# Patient Record
Sex: FEMALE | Race: OTHER | NOT HISPANIC OR LATINO
[De-identification: names, ages, dates, MRNs, and addresses within clinical notes are randomized per-mention and may not be internally consistent; named-entity substitution may affect disease eponyms.]

---

## 2021-08-01 ENCOUNTER — TRANSCRIPTION ENCOUNTER (OUTPATIENT)
Age: 66
End: 2021-08-01

## 2021-10-19 ENCOUNTER — EMERGENCY (EMERGENCY)
Facility: HOSPITAL | Age: 66
LOS: 1 days | Discharge: HOME | End: 2021-10-19
Attending: EMERGENCY MEDICINE | Admitting: STUDENT IN AN ORGANIZED HEALTH CARE EDUCATION/TRAINING PROGRAM
Payer: MEDICAID

## 2021-10-19 VITALS — SYSTOLIC BLOOD PRESSURE: 102 MMHG | HEART RATE: 76 BPM | DIASTOLIC BLOOD PRESSURE: 64 MMHG

## 2021-10-19 VITALS
OXYGEN SATURATION: 98 % | DIASTOLIC BLOOD PRESSURE: 82 MMHG | TEMPERATURE: 98 F | HEIGHT: 60 IN | HEART RATE: 92 BPM | SYSTOLIC BLOOD PRESSURE: 157 MMHG | RESPIRATION RATE: 16 BRPM | WEIGHT: 115.08 LBS

## 2021-10-19 DIAGNOSIS — G43.909 MIGRAINE, UNSPECIFIED, NOT INTRACTABLE, WITHOUT STATUS MIGRAINOSUS: ICD-10-CM

## 2021-10-19 LAB
ALBUMIN SERPL ELPH-MCNC: 4.1 G/DL — SIGNIFICANT CHANGE UP (ref 3.4–5)
ALP SERPL-CCNC: 117 U/L — SIGNIFICANT CHANGE UP (ref 40–120)
ALT FLD-CCNC: 27 U/L — SIGNIFICANT CHANGE UP (ref 12–42)
ANION GAP SERPL CALC-SCNC: 10 MMOL/L — SIGNIFICANT CHANGE UP (ref 9–16)
AST SERPL-CCNC: 23 U/L — SIGNIFICANT CHANGE UP (ref 15–37)
BASOPHILS # BLD AUTO: 0.04 K/UL — SIGNIFICANT CHANGE UP (ref 0–0.2)
BASOPHILS NFR BLD AUTO: 0.5 % — SIGNIFICANT CHANGE UP (ref 0–2)
BILIRUB SERPL-MCNC: 0.4 MG/DL — SIGNIFICANT CHANGE UP (ref 0.2–1.2)
BUN SERPL-MCNC: 12 MG/DL — SIGNIFICANT CHANGE UP (ref 7–23)
CALCIUM SERPL-MCNC: 9.1 MG/DL — SIGNIFICANT CHANGE UP (ref 8.5–10.5)
CHLORIDE SERPL-SCNC: 106 MMOL/L — SIGNIFICANT CHANGE UP (ref 96–108)
CO2 SERPL-SCNC: 27 MMOL/L — SIGNIFICANT CHANGE UP (ref 22–31)
CREAT SERPL-MCNC: 0.74 MG/DL — SIGNIFICANT CHANGE UP (ref 0.5–1.3)
EOSINOPHIL # BLD AUTO: 0.01 K/UL — SIGNIFICANT CHANGE UP (ref 0–0.5)
EOSINOPHIL NFR BLD AUTO: 0.1 % — SIGNIFICANT CHANGE UP (ref 0–6)
GLUCOSE SERPL-MCNC: 102 MG/DL — HIGH (ref 70–99)
HCT VFR BLD CALC: 38.7 % — SIGNIFICANT CHANGE UP (ref 34.5–45)
HGB BLD-MCNC: 12.8 G/DL — SIGNIFICANT CHANGE UP (ref 11.5–15.5)
IMM GRANULOCYTES NFR BLD AUTO: 0.2 % — SIGNIFICANT CHANGE UP (ref 0–1.5)
LYMPHOCYTES # BLD AUTO: 1.59 K/UL — SIGNIFICANT CHANGE UP (ref 1–3.3)
LYMPHOCYTES # BLD AUTO: 19.6 % — SIGNIFICANT CHANGE UP (ref 13–44)
MCHC RBC-ENTMCNC: 29 PG — SIGNIFICANT CHANGE UP (ref 27–34)
MCHC RBC-ENTMCNC: 33.1 GM/DL — SIGNIFICANT CHANGE UP (ref 32–36)
MCV RBC AUTO: 87.6 FL — SIGNIFICANT CHANGE UP (ref 80–100)
MONOCYTES # BLD AUTO: 0.43 K/UL — SIGNIFICANT CHANGE UP (ref 0–0.9)
MONOCYTES NFR BLD AUTO: 5.3 % — SIGNIFICANT CHANGE UP (ref 2–14)
NEUTROPHILS # BLD AUTO: 6.02 K/UL — SIGNIFICANT CHANGE UP (ref 1.8–7.4)
NEUTROPHILS NFR BLD AUTO: 74.3 % — SIGNIFICANT CHANGE UP (ref 43–77)
NRBC # BLD: 0 /100 WBCS — SIGNIFICANT CHANGE UP (ref 0–0)
PLATELET # BLD AUTO: 250 K/UL — SIGNIFICANT CHANGE UP (ref 150–400)
POTASSIUM SERPL-MCNC: 4 MMOL/L — SIGNIFICANT CHANGE UP (ref 3.5–5.3)
POTASSIUM SERPL-SCNC: 4 MMOL/L — SIGNIFICANT CHANGE UP (ref 3.5–5.3)
PROT SERPL-MCNC: 8.1 G/DL — SIGNIFICANT CHANGE UP (ref 6.4–8.2)
RBC # BLD: 4.42 M/UL — SIGNIFICANT CHANGE UP (ref 3.8–5.2)
RBC # FLD: 13.6 % — SIGNIFICANT CHANGE UP (ref 10.3–14.5)
SODIUM SERPL-SCNC: 143 MMOL/L — SIGNIFICANT CHANGE UP (ref 132–145)
WBC # BLD: 8.11 K/UL — SIGNIFICANT CHANGE UP (ref 3.8–10.5)
WBC # FLD AUTO: 8.11 K/UL — SIGNIFICANT CHANGE UP (ref 3.8–10.5)

## 2021-10-19 PROCEDURE — 99284 EMERGENCY DEPT VISIT MOD MDM: CPT

## 2021-10-19 PROCEDURE — 36415 COLL VENOUS BLD VENIPUNCTURE: CPT

## 2021-10-19 PROCEDURE — 96375 TX/PRO/DX INJ NEW DRUG ADDON: CPT

## 2021-10-19 PROCEDURE — 96374 THER/PROPH/DIAG INJ IV PUSH: CPT

## 2021-10-19 PROCEDURE — 85025 COMPLETE CBC W/AUTO DIFF WBC: CPT

## 2021-10-19 PROCEDURE — 99284 EMERGENCY DEPT VISIT MOD MDM: CPT | Mod: 25

## 2021-10-19 PROCEDURE — 80053 COMPREHEN METABOLIC PANEL: CPT

## 2021-10-19 RX ORDER — SODIUM CHLORIDE 9 MG/ML
1000 INJECTION INTRAMUSCULAR; INTRAVENOUS; SUBCUTANEOUS ONCE
Refills: 0 | Status: COMPLETED | OUTPATIENT
Start: 2021-10-19 | End: 2021-10-19

## 2021-10-19 RX ORDER — KETOROLAC TROMETHAMINE 30 MG/ML
15 SYRINGE (ML) INJECTION ONCE
Refills: 0 | Status: DISCONTINUED | OUTPATIENT
Start: 2021-10-19 | End: 2021-10-19

## 2021-10-19 RX ORDER — METOCLOPRAMIDE HCL 10 MG
10 TABLET ORAL ONCE
Refills: 0 | Status: COMPLETED | OUTPATIENT
Start: 2021-10-19 | End: 2021-10-19

## 2021-10-19 RX ADMIN — Medication 15 MILLIGRAM(S): at 13:36

## 2021-10-19 RX ADMIN — Medication 10 MILLIGRAM(S): at 13:36

## 2021-10-19 RX ADMIN — SODIUM CHLORIDE 1000 MILLILITER(S): 9 INJECTION INTRAMUSCULAR; INTRAVENOUS; SUBCUTANEOUS at 13:36

## 2021-10-19 NOTE — ED PROVIDER NOTE - OBJECTIVE STATEMENT
66 y/o F with PMHx of migraine ARIAS presents to the ED for 5 days of a pounding L sided HA with gradual onset. Pt also reports associated light sensitivity and nausea that is typical of her usual migraines. No relief with Excedrin use at home. Pt has been worked up for migraines as outpatient in the past with imaging studies [including MRI] showing negative results. Pt denies neck pain/stiffness, vomiting, fevers, chills, changes in vision/speech, or unilateral numbness/weakness.

## 2021-10-19 NOTE — ED PROVIDER NOTE - PROGRESS NOTE DETAILS
Pt reports complete resolution of headache after meds and wants to go home.  Labs unremarkable.    Will give referral to neurologist as outpatient as pt has not seen neuro in years.  Return precautions discussed with pt and daughter at bedside.

## 2021-10-19 NOTE — ED PROVIDER NOTE - PATIENT PORTAL LINK FT
You can access the FollowMyHealth Patient Portal offered by [facility] by registering at the following website: [email or web address]. By joining [employer]s FollowMyHealth portal, you will also be able to view your health information using other applications (apps) compatible with our system.

## 2021-10-19 NOTE — ED ADULT TRIAGE NOTE - CHIEF COMPLAINT QUOTE
Patient history of migraine, presents to the ED complaining of headache and nausea x 5 days. Been  taking Excedrin with minimal relief.

## 2021-10-19 NOTE — ED PROVIDER NOTE - CARE PROVIDER_API CALL
Kamila Corral)  Neurology; Vascular Neurology  [address], [address] [place] [address]  Phone: [phone]  Fax: [phone]  Follow Up Time:     Miya Greenberg)  Neurology  [address] [place] [address]  Phone: [phone]  Fax: [phone]  Follow Up Time:

## 2021-10-19 NOTE — ED PROVIDER NOTE - CLINICAL SUMMARY MEDICAL DECISION MAKING FREE TEXT BOX
66 y/o F with Hx of migraines presenting with 5 days of L sided HA that is typical of her usual migraines. On exam, Pt is afebrile and well appearing. No localizing neurological deficits. Pt has FROM of the neck. Suspect for migraine HA. No red flags to suggest subarachnoid bleed at this time. Will treat pain and reassess.

## 2021-10-19 NOTE — ED PROVIDER NOTE - CARE PROVIDERS DIRECT ADDRESSES
[email or web address]

## 2021-10-19 NOTE — ED PROVIDER NOTE - NSFOLLOWUPINSTRUCTIONS_ED_ALL_ED_FT
Migraña    LO QUE NECESITA SABER:    ¿Qué es elroy migraña?Elroy migraña es un dolor de keith intenso. El dolor puede ser tan severo que interfiere con sera actividades cotidianas. Elroy migraña puede durar desde pocas horas hasta varios días. La causa exacta de la migraña no es conocida. Los antecedentes familiares de migrañas aumentan winston riesgo. El riesgo también es mayor si es kerry o ymii medicamentos davida estrógenos o un vasodilatador.    ¿Cuáles son las señales de advertencia de que elroy migraña está por comenzar?Los signos de advertencia generalmente comienzan de 15 a 60 minutos antes del dolor de keith:  •Cambios visuales (auras), davida visión borrosa, ceguera temporal o manchas brillantes, líneas o alucinaciones      •Cansancio anormal o bostezos frecuentes      •Hormigueo en winston brazo o pierna      ¿Cuáles son los signos y síntomas de elroy migraña?La migraña comienza generalmente con un dolor monótono alrededor del umm o la sien. El dolor podría empeorar con el movimiento. Usted también podría tener lo siguiente:  •Dolor en [place] keith que podría aumentar hasta el punto que usted no es capaz de realizar sera actividades cotidianas      •Dolor en mark o ambos lados de winston keith      •Dolor punzante, pulsante o jaime en la keith      •Náuseas y vómitos      •Sensibilidad a la tevin, el ruido o los olores      ¿Qué puede desencadenar elroy migraña?  •El estrés, fatiga de los ojos, dormir demasiado o no dormir lo suficiente      •Cambios hormonales en las mujeres debido a las píldoras anticonceptivas, embarazo, menopausia o ebony la menstruación      •Omitir comidas, pasar mucho tiempo sin comer o no mehreen suficientes líquidos      •Ciertos alimentos o bebidas, davida el chocolate, queso arden, alcohol o bebidas que contienen cafeína      •Alimentos que contienen gluten, nitratos, glutamato monosódico o endulzantes artificiales      •Tevin solar, luces brillantes o luces parpadeantes, ruidos marycarmen, humo u olores marycarmen      •Calor, humedad o cambios en el clima      ¿Cómo se diagnostica elroy migraña?Winston médico le preguntará acerca de sera medardo de keith. Describa el dolor y otros síntomas, davida náuseas. Infórmele al médico si usted enrique que hubo algún desencadenante del dolor. El médico también querrá saber qué fue usted comió y tomó antes que le empezara el dolor. Infórmele al médico sobre cualquier afección médica que usted tenga o sea característica de winston mike. Incluya cualquier estresor reciente que ha tenido. Es posible que también necesite alguno de los siguientes tratamientos:  •Un examen neurológicose usa para revisar cómo reaccionan las pupilas a la tevin. Winston médico podría revisar winston memoria, la forma en que agarra las cosas con winston mano y el equilibrio.      •Las imágenes por tomografía computarizada o por resonancia magnéticapodrían mehreen imágenes de winston cerebro. Es posible que le administren un medio de contraste que sirve para que el cerebro se observe con mayor claridad en las imágenes. Dígale al médico si usted alguna vez ha tenido elroy reacción alérgica al líquido de contraste. No entre a la thang donde se realiza la resonancia magnética con algo de metal. El metal puede causar lesiones serias. Dígale al médico si usted tiene algo de metal dentro de winston cuerpo o por encima.      ¿Cómo se trata elroy migraña?Las migrañas no tienen lobito. La meta del tratamiento es reducir sera síntomas.  •Los medicamentospodrían administrarse para ayudarlo a controlar las migrañas. [place] el medicamento tan pronto davida sienta que le comienza elroy migraña, o según le hayan indicado. Winston médico podría recomendarle cualquiera de los siguientes:?Medicamentos para la migrañase utilizan para ayudar a evitar o detener elroy migraña.      ?Los RENATE,pueden disminuir la inflamación y el dolor o la fiebre. Johanne medicamento está disponible con o sin elroy receta médica. Los RENATE pueden causar sangrado estomacal o problemas renales en ciertas personas. Si usted yimi un medicamento anticoagulante, siempre pregúntele a winston médico si los RENATE son seguros para usted. Siempre emile la etiqueta de johanne medicamento y siga las instrucciones.      ?Acetaminofénalivia el dolor y baja la fiebre. Está disponible sin receta médica. Pregunte la cantidad y la frecuencia con que debe tomarlos. Siga las indicaciones. Emile las etiquetas de todos los demás medicamentos que esté usando para saber si también contienen acetaminofén, o pregunte a winston médico o farmacéutico. El acetaminofén puede causar daño en el hígado cuando no se yimi de forma correcta. No use más de 4 gramos (4000 miligramos) en total de acetaminofeno en un día.      ?Puede administrarsepodrían administrarse. Pregunte al médico cómo debe mehreen johanne medicamento de forma العلي. Algunos medicamentos recetados para el dolor contienen acetaminofén. No tome otros medicamentos que contengan acetaminofén sin consultarlo con winston médico. Demasiado acetaminofeno puede causar daño al hígado. Los medicamentos recetados para el dolor podrían causar estreñimiento. Pregunte a winston médico davida prevenir o tratar estreñimiento.      ?Los medicamentos contra las náuseaspueden darse para calmar winston estómago y ayudarle a prevenir los vómitos. Johanne medicamento también puede aliviar el dolor.      •La terapia cognitiva conductual (TCC)puede ayudarlo a aprender maneras de controlar y prevenir las migrañas. Un terapeuta puede enseñarle a relajarse y a reducir el estrés. Puede aprender maneras de crear elroy nutrición saludable, actividad y hábitos de sueño para prevenir las migrañas. El terapeuta también puede ayudarlo a controlar afecciones que pueden afectar las migrañas, davida la ansiedad o la depresión.      ¿Qué puedo hacer para manejar mis síntomas?  •Repose en elroy habitación oscura y tranquila.[place] ayudará a disminuir el dolor. El dormir también podría ayudarlo a aliviar winston dolor.      •Aplique hielo para reducir el dolor.Use elroy compresa de hielo o ponga hielo triturado en elroy bolsa de plástico. Cubra el paquete de hielo con elroy toalla y colóqueselo en la keith. Aplique hielo ebony 15 a 20 minutos cada hora.      •Aplique calor para disminuir el dolor y los espasmos musculares.Utilice elroy toalla pequeña empapada con [place], elroy almohada térmica o tome un baño de ezra con agua tibia. Aplique la compresa caliente sobre el área por 20 a 30 minutos cada 2 horas. Usted puede alternar el calor y el hielo.      •Mantenga un registro de las migrañas.Escriba cuándo comienzan y terminan sera migrañas. Incluya sera síntomas y qué estaba haciendo cuando comenzó elroy migraña. Registre lo que comió y lo que tomó las 24 horas antes de que comenzara winston migraña. Mantenga un registro de lo que hizo para tratar winston migraña y si funcionó. Traiga el registro de las migrañas con usted a las citas con winston médico.      ¿Qué puedo hacer para evitar otra migraña?  •Evite el dolor de keith por uso excesivo de medicamentos.[place] los analgésicos solamente según le hayan indicado. Un medicamento puede estar limitado a elroy cierta cantidad cada mes. El médico puede ayudarlo a crear un plan para que reciba elroy cantidad العلي cada mes.      •No fume.La nicotina y otras sustancias químicas en los cigarrillos y puros pueden desencadenar elroy migraña o agravarla. Pida información a winston médico si usted actualmente fuma y necesita ayuda para dejar de fumar. Los cigarrillos electrónicos o el tabaco sin humo igualmente contienen nicotina. Consulte con winston médico antes de utilizar estos productos.      •No consuma alcohol.El alcohol puede provocar migraña. También puede impedir que tengan efecto los medicamentos para la migraña.      •Sea físicamente activo.La actividad física, davida el ejercicio, puede ayudar a prevenir las migrañas. Consulte con winston médico acerca del mejor plan de actividad para usted. Trate de hacer al menos 30 minutos de actividad física la mayoría de los joselo.  MIKE ASIÁTICA CAMINANDO DAVIDA EJERCICIO           •Controle el estrés.El estrés podría provocar migraña. Aprenda nuevas maneras de relajarse davida la respiración profunda.      •Establezca un horario para dormir.Acuéstese y levántese a la misma hora cada día. No ryder televisión inmediatamente antes de acostarse.      •Consuma alimentos saludables y variados.Incluya alimentos saludables davida la fruta, verduras, panes de grano entero, productos lácteos bajos en grasa, frijoles, carne magra y pescado. No consuma alimentos o bebidas que puedan desencadenar sera migrañas.  Alimentos saludables           •[place] más líquidos para evitar la deshidratación.Winston médico le indicará cuánto líquido debería beber a diario y qué líquidos son los mejores para usted.      Llame al número local de emergencias (911 en los [place]) o pídale a alguien que llame si:  •Usted siente que se va a desmayar, se siente confundido o sufre elroy convulsión.          ¿Cuándo alex buscar atención inmediata?  •Usted tiene dolor de keith que parece ser diferente o mucho peor que winston migraña habitual.      •Usted tiene un dolor de keith severo con fiebre o rigidez en el alhaji.      •Usted tiene nuevos problemas con el habla, la visión, el equilibrio o el movimiento.      ¿Cuándo alex llamar a mi médico?  •Winston migraña interfiere con sera actividades cotidianas.      •Sera medicamentos o tratamientos matt de funcionar.      •Usted tiene preguntas o inquietudes acerca de winston condición o cuidado.      ACUERDOS SOBRE WINSTON CUIDADO:    Usted tiene el derecho de ayudar a planear wintson cuidado. Aprenda todo lo que pueda sobre winston condición y davida darle tratamiento. Discuta sera opciones de tratamiento con sera médicos para decidir el cuidado que usted desea recibir. Usted siempre tiene el derecho de rechazar el tratamiento.

## 2021-11-04 PROBLEM — G43.909 MIGRAINE, UNSPECIFIED, NOT INTRACTABLE, WITHOUT STATUS MIGRAINOSUS: Chronic | Status: ACTIVE | Noted: 2021-10-19

## 2021-11-04 PROBLEM — Z00.00 ENCOUNTER FOR PREVENTIVE HEALTH EXAMINATION: Status: ACTIVE | Noted: 2021-11-04

## 2021-11-05 ENCOUNTER — APPOINTMENT (OUTPATIENT)
Dept: PAIN MANAGEMENT | Facility: CLINIC | Age: 66
End: 2021-11-05

## 2021-11-15 ENCOUNTER — APPOINTMENT (OUTPATIENT)
Dept: PAIN MANAGEMENT | Facility: CLINIC | Age: 66
End: 2021-11-15
Payer: MEDICAID

## 2021-11-15 VITALS
WEIGHT: 118 LBS | SYSTOLIC BLOOD PRESSURE: 127 MMHG | HEIGHT: 60 IN | BODY MASS INDEX: 23.16 KG/M2 | HEART RATE: 96 BPM | DIASTOLIC BLOOD PRESSURE: 80 MMHG

## 2021-11-15 DIAGNOSIS — I67.1 CEREBRAL ANEURYSM, NONRUPTURED: ICD-10-CM

## 2021-11-15 PROCEDURE — 99205 OFFICE O/P NEW HI 60 MIN: CPT

## 2021-11-15 NOTE — HISTORY OF PRESENT ILLNESS
[FreeTextEntry1] : Ms. SMITH MOCTEZUMA is a 65 year RH female accompanied by her son with Pmhx Migraines who is here for initial evaluation of progressively worsening Migraines.  Onset of Migraines was in her 20s. She is now having daily headaches milder headaches and more severe headaches 2-3x/week lasting the entire day. More severe headaches on either side of her head throbbing pain 10/10 , associated with sensitivity to light, noise, N/V. + aura previously flashing lights but not recently.   + tinnitius, vertigo \par \par + h/o syncopal episode with prior w/o by cardio in [place] \par \par Prior meds: Topamax 50 mg BID(helped but developed cramps and weight loss); intra-nasal abortive, rectal abortive \par Current meds: Vit D; Excedrin 2 tabs 2x/week. \par \par SMITH typically sleeps 7hours per night. \par \par SMITH has 2-3 servings of caffeine per day.\par \par Social hx: Lives with her daughter.  She denies tobacco, etoh or illicit drug use. \par \par \par

## 2021-11-15 NOTE — PHYSICAL EXAM
[FreeTextEntry1] : General appearance: Well nourished and with attention to grooming.No signs of distress. No signs of toxicity.\par Head: + tenderness to along Greater occipital nerve left \par Neck/spine: Examination of the cervical spine reveals normal range of motion in the neck, no midline tenderness, no paraspinal muscle tenderness, no facet tenderness, negative Spurling's bilaterally. Examination of the lumbar spine reveals normal range of motion including flexion, extension and lateral rotation. No midline tenderness, no paraspinal muscle tenderness, negative facet loading, no tenderness or sciatic notch, no tenderness of sciatic notch, no tenderness of bilateral greater trochanters, Negative GOPAL, negative SLRT bilaterally.  \par CV: RRR.\par Skin: No rash.\par Musculoskeletal: No joint deformities, no scoliosis, lordosis, kyphosis, pes cavus or hammer toes.\par Extremities: No peripheral edema.\par \par Neurological exam:\par Mental status: Patient is alert, attentive and oriented X3. Speech is coherent and fluent without dysarthria or aphasia.  Affect normal.\par CN: Pupils were 6 mm and reactive to light. Extraocular movements were full. Visual fields are intact to confrontation. No nystagmus. There was no face, jaw, palate or tongue weakness or atrophy. Facial sensation was normal and symmetric. Hearing was grossly intact. Shoulder shrug was normal.\par Motor exam revealed normal bulk and tone.  There is no pronator drift. Manual muscle testing revealed 5/5 strength throughout including neck flexion/extension and proximal and distal muscles of the arms and legs.\par Sensory exam demonstrated was normal to touch, pin.\par Romberg was negative.\par DTRs: 2+ b/l biceps, 2+ triceps, 2 + brachioradialis, 2+ patellar, and 2+ ankle reflexes. Plantar responses were flexor bilaterally. No clonus, Nathan's or crossed adductor response.\par Gait: Normal gait. She is able to walk on heels, toes, and tandem without difficulty. \par \par BP Sittin/74\par BP Standin/78\par BP Standing 3min later: 124/74\par \par \par

## 2021-11-15 NOTE — ASSESSMENT
[FreeTextEntry1] : 66 y/o F with Pmhx chronic Migraines with constant daily headaches , Positional vertigo, hearing loss, tinnitus, previous syncopal episode . \par \par She has failed Topamax and given cardiac hx do not recommend betablocker. Trial of Quilipti 30 mgs \par MRI Brain ro structural  vascular pathology\par ENT referral \par Cardio referral \par \par DENNISBHUPENDRA RHIANNA  was seen and examined with Dr. Perdue who agrees with the assessment and plan.\par \par

## 2022-01-14 RX ORDER — ATOGEPANT 30 MG/1
30 TABLET ORAL
Qty: 30 | Refills: 0 | Status: ACTIVE | COMMUNITY
Start: 2021-11-15 | End: 1900-01-01

## 2022-03-21 ENCOUNTER — APPOINTMENT (OUTPATIENT)
Dept: PAIN MANAGEMENT | Facility: CLINIC | Age: 67
End: 2022-03-21

## 2022-07-18 ENCOUNTER — APPOINTMENT (OUTPATIENT)
Dept: PAIN MANAGEMENT | Facility: CLINIC | Age: 67
End: 2022-07-18